# Patient Record
Sex: FEMALE | Race: WHITE | Employment: UNEMPLOYED | URBAN - METROPOLITAN AREA
[De-identification: names, ages, dates, MRNs, and addresses within clinical notes are randomized per-mention and may not be internally consistent; named-entity substitution may affect disease eponyms.]

---

## 2020-05-05 ENCOUNTER — HOSPITAL ENCOUNTER (OUTPATIENT)
Age: 25
Discharge: HOME | End: 2020-05-05
Payer: COMMERCIAL

## 2020-05-05 ENCOUNTER — HOSPITAL ENCOUNTER (OUTPATIENT)
Dept: CT IMAGING | Age: 25
Discharge: HOME | End: 2020-05-05
Payer: COMMERCIAL

## 2020-05-05 LAB
REASON FOR REJECTION: NORMAL
REASON FOR REJECTION: NORMAL
REJECTED TEST: NORMAL
REJECTED TEST: NORMAL

## 2020-05-05 PROCEDURE — 71275 CT ANGIOGRAPHY CHEST: CPT

## 2020-05-05 PROCEDURE — 36415 COLL VENOUS BLD VENIPUNCTURE: CPT

## 2020-05-05 PROCEDURE — 82550 ASSAY OF CK (CPK): CPT

## 2020-05-05 PROCEDURE — 6360000004 HC RX CONTRAST MEDICATION: Performed by: INTERNAL MEDICINE

## 2020-05-05 PROCEDURE — 85025 COMPLETE CBC W/AUTO DIFF WBC: CPT

## 2020-05-05 RX ORDER — SODIUM CHLORIDE 0.9 % (FLUSH) 0.9 %
10 SYRINGE (ML) INJECTION PRN
Status: DISCONTINUED | OUTPATIENT
Start: 2020-05-05 | End: 2020-05-06 | Stop reason: HOSPADM

## 2020-05-05 RX ADMIN — IOPAMIDOL 90 ML: 755 INJECTION, SOLUTION INTRAVENOUS at 13:30

## 2020-05-06 LAB
ALBUMIN SERPL-MCNC: 3.9 GM/DL (ref 3.4–5)
ALP BLD-CCNC: 63 IU/L (ref 40–128)
ALT SERPL-CCNC: 18 U/L (ref 10–40)
ANION GAP SERPL CALCULATED.3IONS-SCNC: 11 MMOL/L (ref 4–16)
AST SERPL-CCNC: 21 IU/L (ref 15–37)
BASOPHILS ABSOLUTE: 0.1 K/CU MM
BASOPHILS RELATIVE PERCENT: 1 % (ref 0–1)
BILIRUB SERPL-MCNC: 0.3 MG/DL (ref 0–1)
BUN BLDV-MCNC: 6 MG/DL (ref 6–23)
CALCIUM SERPL-MCNC: 9.1 MG/DL (ref 8.3–10.6)
CHLORIDE BLD-SCNC: 102 MMOL/L (ref 99–110)
CO2: 23 MMOL/L (ref 21–32)
CREAT SERPL-MCNC: 0.6 MG/DL (ref 0.6–1.1)
D DIMER: 656 NG/ML(DDU)
DIFFERENTIAL TYPE: ABNORMAL
EOSINOPHILS ABSOLUTE: 0.3 K/CU MM
EOSINOPHILS RELATIVE PERCENT: 5.2 % (ref 0–3)
GFR AFRICAN AMERICAN: >60 ML/MIN/1.73M2
GFR NON-AFRICAN AMERICAN: >60 ML/MIN/1.73M2
GLUCOSE BLD-MCNC: 91 MG/DL (ref 70–99)
HCT VFR BLD CALC: 42.6 % (ref 37–47)
HEMOGLOBIN: 13.4 GM/DL (ref 12.5–16)
IMMATURE NEUTROPHIL %: 0.2 % (ref 0–0.43)
LYMPHOCYTES ABSOLUTE: 2.4 K/CU MM
LYMPHOCYTES RELATIVE PERCENT: 39.4 % (ref 24–44)
MCH RBC QN AUTO: 29 PG (ref 27–31)
MCHC RBC AUTO-ENTMCNC: 31.5 % (ref 32–36)
MCV RBC AUTO: 92.2 FL (ref 78–100)
MONOCYTES ABSOLUTE: 0.5 K/CU MM
MONOCYTES RELATIVE PERCENT: 9 % (ref 0–4)
NUCLEATED RBC %: 0 %
PDW BLD-RTO: 12.7 % (ref 11.7–14.9)
PLATELET # BLD: 252 K/CU MM (ref 140–440)
PMV BLD AUTO: 11.9 FL (ref 7.5–11.1)
POTASSIUM SERPL-SCNC: 4.1 MMOL/L (ref 3.5–5.1)
RBC # BLD: 4.62 M/CU MM (ref 4.2–5.4)
SEGMENTED NEUTROPHILS ABSOLUTE COUNT: 2.7 K/CU MM
SEGMENTED NEUTROPHILS RELATIVE PERCENT: 45.2 % (ref 36–66)
SODIUM BLD-SCNC: 136 MMOL/L (ref 135–145)
TOTAL CK: 70 IU/L (ref 26–140)
TOTAL IMMATURE NEUTOROPHIL: 0.01 K/CU MM
TOTAL NUCLEATED RBC: 0 K/CU MM
TOTAL PROTEIN: 6.9 GM/DL (ref 6.4–8.2)
TROPONIN T: <0.01 NG/ML
WBC # BLD: 6 K/CU MM (ref 4–10.5)

## 2020-05-06 PROCEDURE — 84484 ASSAY OF TROPONIN QUANT: CPT

## 2020-05-06 PROCEDURE — 85025 COMPLETE CBC W/AUTO DIFF WBC: CPT

## 2020-05-06 PROCEDURE — 82550 ASSAY OF CK (CPK): CPT

## 2020-05-06 PROCEDURE — 80053 COMPREHEN METABOLIC PANEL: CPT

## 2020-05-06 PROCEDURE — 85379 FIBRIN DEGRADATION QUANT: CPT

## 2020-05-06 PROCEDURE — 36415 COLL VENOUS BLD VENIPUNCTURE: CPT

## 2022-06-10 ENCOUNTER — HOSPITAL ENCOUNTER (OUTPATIENT)
Dept: ULTRASOUND IMAGING | Age: 27
Discharge: HOME | End: 2022-06-10
Payer: COMMERCIAL

## 2022-06-10 DIAGNOSIS — R10.11 RIGHT UPPER QUADRANT PAIN: ICD-10-CM

## 2022-06-10 PROCEDURE — 76705 ECHO EXAM OF ABDOMEN: CPT

## 2022-08-01 ENCOUNTER — HOSPITAL ENCOUNTER (OUTPATIENT)
Dept: CT IMAGING | Age: 27
Discharge: HOME | End: 2022-08-01
Payer: COMMERCIAL

## 2022-08-01 DIAGNOSIS — R10.31 ABDOMINAL CRAMPING, BILATERAL LOWER QUADRANT: ICD-10-CM

## 2022-08-01 DIAGNOSIS — R10.32 ABDOMINAL CRAMPING, BILATERAL LOWER QUADRANT: ICD-10-CM

## 2022-08-01 DIAGNOSIS — R10.12 LUQ ABDOMINAL PAIN: ICD-10-CM

## 2022-08-01 PROCEDURE — 74177 CT ABD & PELVIS W/CONTRAST: CPT

## 2022-08-01 PROCEDURE — 6360000004 HC RX CONTRAST MEDICATION: Performed by: FAMILY MEDICINE

## 2022-08-01 RX ADMIN — IOHEXOL 50 ML: 240 INJECTION, SOLUTION INTRATHECAL; INTRAVASCULAR; INTRAVENOUS; ORAL at 10:36

## 2022-08-01 RX ADMIN — IOPAMIDOL 100 ML: 755 INJECTION, SOLUTION INTRAVENOUS at 12:16

## 2022-12-11 ENCOUNTER — HOSPITAL ENCOUNTER (EMERGENCY)
Age: 27
Discharge: HOME | End: 2022-12-11
Attending: EMERGENCY MEDICINE
Payer: COMMERCIAL

## 2022-12-11 ENCOUNTER — APPOINTMENT (OUTPATIENT)
Dept: GENERAL RADIOLOGY | Age: 27
End: 2022-12-11
Payer: COMMERCIAL

## 2022-12-11 VITALS
HEIGHT: 64 IN | OXYGEN SATURATION: 98 % | DIASTOLIC BLOOD PRESSURE: 76 MMHG | WEIGHT: 273 LBS | RESPIRATION RATE: 16 BRPM | BODY MASS INDEX: 46.61 KG/M2 | HEART RATE: 83 BPM | SYSTOLIC BLOOD PRESSURE: 140 MMHG | TEMPERATURE: 97.9 F

## 2022-12-11 DIAGNOSIS — S92.501A CLOSED FRACTURE OF PHALANX OF RIGHT FOURTH TOE, INITIAL ENCOUNTER: Primary | ICD-10-CM

## 2022-12-11 PROCEDURE — 6370000000 HC RX 637 (ALT 250 FOR IP): Performed by: EMERGENCY MEDICINE

## 2022-12-11 PROCEDURE — 73660 X-RAY EXAM OF TOE(S): CPT

## 2022-12-11 PROCEDURE — 99283 EMERGENCY DEPT VISIT LOW MDM: CPT

## 2022-12-11 RX ORDER — BUSPIRONE HYDROCHLORIDE 7.5 MG/1
TABLET ORAL 3 TIMES DAILY
COMMUNITY

## 2022-12-11 RX ORDER — BACILLUS COAGULANS/INULIN 1B-250 MG
CAPSULE ORAL
COMMUNITY

## 2022-12-11 RX ORDER — MULTIVIT-MIN/IRON/FOLIC ACID/K 18-600-40
CAPSULE ORAL
COMMUNITY

## 2022-12-11 RX ORDER — NAPROXEN 500 MG/1
500 TABLET ORAL ONCE
Status: COMPLETED | OUTPATIENT
Start: 2022-12-11 | End: 2022-12-11

## 2022-12-11 RX ORDER — NAPROXEN 500 MG/1
500 TABLET ORAL 2 TIMES DAILY
Qty: 20 TABLET | Refills: 0 | Status: SHIPPED | OUTPATIENT
Start: 2022-12-11

## 2022-12-11 RX ADMIN — NAPROXEN 500 MG: 500 TABLET ORAL at 17:16

## 2022-12-11 ASSESSMENT — PAIN SCALES - GENERAL: PAINLEVEL_OUTOF10: 5

## 2022-12-11 ASSESSMENT — PAIN - FUNCTIONAL ASSESSMENT: PAIN_FUNCTIONAL_ASSESSMENT: 0-10

## 2022-12-11 NOTE — ED PROVIDER NOTES
Emergency Department Encounter  Location: [facility] [address] [place] [address]    Patient: Chery Centeno  MRN: [id number]  : 1995  Date of evaluation: 2022  ED Provider: Stefan Leblanc DO, FACEP    Chief Complaint:    Toe Injury (Right 2, 3, 4th toes and possible shut in door or door hitting foot, bruising noted)    [place]:  Chery Centeno is a 24 y.o. female that presents to the emergency department with complaints of injury to her right fourth toe. The patient states she tripped going out on her porch to get an [place] and struck her foot against the edge of the door. She has discoloration and bruising to her right fourth toe. She is having difficulty ambulating secondary to pain. ROS:  At least 4 systems reviewed and otherwise acutely negative except as in the [address]   Negative for fever or chills  Negative for chest pain  Negative for shortness of breath  Negative for nausea vomiting diarrhea or constipation    Past Medical History:   Diagnosis Date    Anxiety     \"on Inderal for this\"    Environmental allergies      Past Surgical History:   Procedure Laterality Date    CHOLECYSTECTOMY, LAPAROSCOPIC  2016     Family History   Problem Relation Age of Onset    Depression Mother     Anxiety Disorder Mother      Social History     Socioeconomic History    Marital status: Single     Spouse name: Not on file    Number of children: Not on file    Years of education: Not on file    Highest education level: Not on file   Occupational History    Not on file   Tobacco Use    Smoking status: Never    Smokeless tobacco: Never   Substance and Sexual Activity    Alcohol use: Yes     Comment: average\"one time per week\"    Drug use: No    Sexual activity: Not on file   Other Topics Concern    Not on file   Social History Narrative    Not on file     Social Determinants of Health     Financial Resource Strain: Not on file   Food Insecurity: Not on file   Transportation Needs: Not on file Physical Activity: Not on file   Stress: Not on file   Social Connections: Not on file   Intimate Partner Violence: Not on file   Housing Stability: Not on file     Current Facility-Administered Medications   Medication Dose Route Frequency Provider Last Rate Last Admin    naproxen (NAPROSYN) tablet 500 mg  500 mg Oral Once Magdalene Level, DO         Current Outpatient Medications   Medication Sig Dispense Refill    Fexofenadine HCl (ALLEGRA PO) Take by mouth      naproxen (NAPROSYN) 500 MG tablet Take 1 tablet by mouth 2 times daily 20 tablet 0    busPIRone (BUSPAR) 7.5 MG tablet Take by mouth in the morning, at noon, and at bedtime      sertraline (ZOLOFT) 50 MG tablet Take by mouth daily      Bacillus Coagulans-Inulin (PROBIOTIC) 1-250 BILLION-MG CAPS as directed      Cholecalciferol (VITAMIN D) 50 MCG (2000 UT) CAPS capsule 1 tablet      docusate sodium (COLACE) 100 MG capsule Take 1 capsule by mouth daily as needed for Constipation (while taking pain medicine) (Patient not taking: Reported on 12/11/2022) 30 capsule 3    propranolol (INDERAL) 10 MG tablet Take 10 mg by mouth 2 times daily      Norgestim-Eth Estrad Triphasic (TRINESSA, 28, PO) Take by mouth nightly        No Known Allergies  Nursing Notes Reviewed    Physical Exam:  ED Triage Vitals [12/11/22 1510]   Enc Vitals Group      BP (!) 140/76      Heart Rate 83      Resp 16      Temp 97.9 °F (36.6 °C)      Temp Source Oral      SpO2 98 %      Weight 273 lb (123.8 kg)      Height 5' 4\" (1.626 m)      Head Circumference       Peak Flow       Pain Score       Pain Loc       Pain Edu? Excl. in [address]? GENERAL APPEARANCE: Awake and alert. Cooperative. No acute distress. Nontoxic in appearance  HEAD: Normocephalic. Atraumatic. EYES: Sclera anicteric. ENT: Tolerates saliva. NECK: Supple. Trachea midline. LUNGS: Respirations unlabored. EXTREMITIES: No acute deformities.   There is bruising to her fourth toe and tenderness to palpation of the distal phalanx. Sensation is intact and capillary refills less than 3 seconds in the nailbeds. SKIN: Warm and dry. NEUROLOGICAL: No gross facial drooping. PSYCHIATRIC: Normal mood. Labs:  No results found for this visit on 12/11/22. Radiographs (if obtained):  [] The following radiograph was interpreted by myself in the absence of a radiologist:  [x] Radiologist's Report reviewed at time of ED visit:  XR TOE RIGHT (MIN 2 VIEWS)   Final Result   No acute bony or joint abnormality             ED Course and MDM:  Patient presents to the emergency department with an injury to her toe. There is no obvious fracture seen by the radiologist however review of the x-ray shows that there may be a nondisplaced fracture on the lateral portion of the distal phalanx of the fourth toe. She will be najma taped for the fourth to the third. She is placed in a postop shoe and be started on crutches. She is referred to the orthopedic surgeon for recheck. She will be discharged stable condition as instructed return for problems or concerns. She started on Naprosyn for her pain. Final Impression:  1.  Closed fracture of phalanx of left fourth toe, initial encounter      DISPOSITION Discharge - Pending Orders Complete    Patient referred to:  Elian Jaimes DO  [address] [facility] [address] [place] [address] [place] [address]  [phone]    Schedule an appointment as soon as possible for a visit in 1 week  For follow up    Discharge medications:  New Prescriptions    NAPROXEN (NAPROSYN) 500 MG TABLET    Take 1 tablet by mouth 2 times daily     (Please note that portions of this note may have been completed with a voice recognition program. Efforts were made to edit the dictations but occasionally words are mis-transcribed.)    Jose Martinez DO, [address] [facility], [place], [facility],[address]  Board certified in [place] [employer] [place], [place]  12/11/22 2817

## 2022-12-11 NOTE — Clinical Note
Celso Starkey was seen and treated in our emergency department on 12/11/2022. She may return to work on 12/12/2022. Use crutches and postop shoe at work until cleared by orthopedic doctor to return to full duty     If you have any questions or concerns, please don't hesitate to call.       Victor Manuel Lee, DO

## 2022-12-17 SDOH — HEALTH STABILITY: PHYSICAL HEALTH: ON AVERAGE, HOW MANY MINUTES DO YOU ENGAGE IN EXERCISE AT THIS LEVEL?: 30 MIN

## 2022-12-17 SDOH — HEALTH STABILITY: PHYSICAL HEALTH: ON AVERAGE, HOW MANY DAYS PER WEEK DO YOU ENGAGE IN MODERATE TO STRENUOUS EXERCISE (LIKE A BRISK WALK)?: 3 DAYS

## 2022-12-17 ASSESSMENT — SOCIAL DETERMINANTS OF HEALTH (SDOH)

## 2022-12-20 ENCOUNTER — OFFICE VISIT (OUTPATIENT)
Dept: ORTHOPEDIC SURGERY | Age: 27
End: 2022-12-20
Payer: COMMERCIAL

## 2022-12-20 VITALS — HEIGHT: 64 IN | RESPIRATION RATE: 16 BRPM | WEIGHT: 270.3 LBS | BODY MASS INDEX: 46.15 KG/M2

## 2022-12-20 DIAGNOSIS — S90.31XA CONTUSION OF RIGHT FOOT, INITIAL ENCOUNTER: Primary | ICD-10-CM

## 2022-12-20 PROCEDURE — 1036F TOBACCO NON-USER: CPT

## 2022-12-20 PROCEDURE — G8427 DOCREV CUR MEDS BY ELIG CLIN: HCPCS

## 2022-12-20 PROCEDURE — G8484 FLU IMMUNIZE NO ADMIN: HCPCS

## 2022-12-20 PROCEDURE — 99203 OFFICE O/P NEW LOW 30 MIN: CPT

## 2022-12-20 PROCEDURE — G8417 CALC BMI ABV UP PARAM F/U: HCPCS

## 2022-12-20 RX ORDER — AMOXICILLIN AND CLAVULANATE POTASSIUM 875; 125 MG/1; MG/1
1 TABLET, FILM COATED ORAL EVERY 12 HOURS
COMMUNITY
Start: 2022-12-20 | End: 2022-12-30

## 2022-12-20 NOTE — PATIENT INSTRUCTIONS
Wear post operative shoe  May take Ibuprofen or Motrin as needed  Rest, ice, and elevate as needed  Work on ROM and strengthening exercises as discussed  Follow up in 4 weeks

## 2022-12-20 NOTE — PROGRESS NOTES
Patient is a 24y.o. year old female. Patient is in the office today with a fx to the right 4th toe. Patient states that she injured themselves by closing a door on her foot. Patient states that the injury happened 12.11.22. pt presented to the office in a post operative shoe. Pain scale  0/10.     Dominant Hand: right

## 2022-12-21 ASSESSMENT — ENCOUNTER SYMPTOMS
BACK PAIN: 0
SHORTNESS OF BREATH: 0
RHINORRHEA: 0
NAUSEA: 0
COUGH: 0
FACIAL SWELLING: 0

## 2022-12-21 NOTE — PROGRESS NOTES
12/20/2022   Chief Complaint   Patient presents with    Foot Injury     Right 4th toe        History of Present Illness:                             Zachery Deal is a 24 y.o. female presenting as a new patient to the office today with what she has been informed by the emergency room was a fracture to her right fourth toe. Patient states she had a door slam awkwardly on her foot by accident on 12/11/2022. She states she went to the emergency room thinking she had broke her foot/toes and she was placed in a postoperative shoe by the ER providers. Patient states her foot does not hurt while she is using the postoperative shoe but that when out of the shoe her pain levels do increase. She notes some slight bruising at the base of her third fourth and fifth toes as well as a small bruised area under her fourth toenail. Patient denies any distal paresthesias or temperature changes into the forefoot or toes. Patient is a 24y.o. year old female. Patient is in the office today with a fx to the right 4th toe. Patient states that she injured themselves by closing a door on her foot. Patient states that the injury happened 12.11.22. pt presented to the office in a post operative shoe. Pain scale  0/10. Medical History  Patient's medications, allergies, past medical, surgical, social and family histories were reviewed and updated as appropriate.     Past Medical History:   Diagnosis Date    Anxiety     \"on Inderal for this\"    Environmental allergies      Past Surgical History:   Procedure Laterality Date    CHOLECYSTECTOMY, LAPAROSCOPIC  08/09/2016     Family History   Problem Relation Age of Onset    Depression Mother     Anxiety Disorder Mother      Social History     Socioeconomic History    Marital status: Single     Spouse name: None    Number of children: None    Years of education: None    Highest education level: None   Tobacco Use    Smoking status: Never    Smokeless tobacco: Never   Substance and Sexual Activity    Alcohol use: Yes     Comment: average\"one time per week\"    Drug use: No     Social Determinants of Health     Physical Activity: Insufficiently Active    Days of Exercise per Week: 3 days    Minutes of Exercise per Session: 30 min   Intimate Partner Violence: Not At Risk    Fear of Current or Ex-Partner: No    Emotionally Abused: No    Physically Abused: No    Sexually Abused: No     Current Outpatient Medications   Medication Sig Dispense Refill    amoxicillin-clavulanate (AUGMENTIN) 875-125 MG per tablet Take 1 tablet by mouth in the morning and 1 tablet in the evening. Pseudoephedrine-DM-GG 60- MG TABS Take 1 tablet by mouth every 6 hours as needed      busPIRone (BUSPAR) 7.5 MG tablet Take by mouth in the morning, at noon, and at bedtime      sertraline (ZOLOFT) 50 MG tablet Take by mouth daily      Bacillus Coagulans-Inulin (PROBIOTIC) 1-250 BILLION-MG CAPS as directed      Cholecalciferol (VITAMIN D) 50 MCG (2000 UT) CAPS capsule 1 tablet      Fexofenadine HCl (ALLEGRA PO) Take by mouth      naproxen (NAPROSYN) 500 MG tablet Take 1 tablet by mouth 2 times daily 20 tablet 0    propranolol (INDERAL) 10 MG tablet Take 10 mg by mouth 2 times daily      Norgestim-Eth Estrad Triphasic (TRINESSA, 28, PO) Take by mouth nightly       docusate sodium (COLACE) 100 MG capsule Take 1 capsule by mouth daily as needed for Constipation (while taking pain medicine) (Patient not taking: Reported on 12/11/2022) 30 capsule 3     No current facility-administered medications for this visit. No Known Allergies      Review of Systems   Constitutional:  Negative for fever. HENT:  Negative for facial swelling and rhinorrhea. Respiratory:  Negative for cough and shortness of breath. Cardiovascular:  Negative for chest pain. Gastrointestinal:  Negative for nausea. Musculoskeletal:  Positive for arthralgias.  Negative for back pain, gait problem, joint swelling, myalgias, neck pain and neck stiffness. Skin:  Negative for pallor and rash. Neurological:  Negative for facial asymmetry and speech difficulty. Psychiatric/Behavioral:  Negative for agitation and confusion. Examination:  General Exam:  Vitals: Resp 16   Ht 5' 4\" (1.626 m)   Wt 270 lb 4.8 oz (122.6 kg)   LMP 12/12/2022   BMI 46.40 kg/m²    Physical Exam  Constitutional:       General: She is not in acute distress. Appearance: Normal appearance. She is obese. She is not ill-appearing. HENT:      Head: Normocephalic and atraumatic. Nose: No rhinorrhea. Eyes:      General: No scleral icterus. Right eye: No discharge. Left eye: No discharge. Cardiovascular:      Pulses: Normal pulses. Pulmonary:      Effort: Pulmonary effort is normal. No respiratory distress. Breath sounds: No stridor. Musculoskeletal:      Cervical back: Normal range of motion. Comments: Right foot exam: Patient right foot appears with mild ecchymosis at the base of her third fourth and fifth toes. There is also a very small subungual hematoma at the fourth toenail on the right foot. Patient maintains full active range of motion of the ankle, foot, and toes with minimal pain to the entirety of the fourth toe. Dorsal pedal pulse 2+, brisk capillary refill. Sensation light touch intact throughout all surfaces of the right lower extremity. Patient able to ambulate with the postoperative shoe without complication. Skin:     General: Skin is warm. Capillary Refill: Capillary refill takes less than 2 seconds. Coloration: Skin is not jaundiced or pale. Findings: Bruising present. Neurological:      General: No focal deficit present. Mental Status: She is alert and oriented to person, place, and time.    Psychiatric:         Mood and Affect: Mood normal.         Behavior: Behavior normal.      Diagnostic testing:  X-ray images were reviewed by myself and discussed with the patient:    Imaging results of the right foot from the emergency room:  Impression   No acute bony or joint abnormality           Office Procedures:  No orders of the defined types were placed in this encounter. Assessment and Plan  1. Contusion of the right forefoot    -I explained to the patient that I agree with the radiology report from her initial emergency room visit and I do not believe she has any acute fractures at this time. I do note the bruising on the dorsal aspect of her toes and reassured her that I am fairly confident she simply suffered a bruise/contusion to her toes and forefoot. I advised her to continue using the postoperative shoe for the next few days and then transition to an athletic shoe as tolerated. -Patient instructed to return to our office if she notices any changes in the small hematoma at the toenail. I do not suspect this will cause her any complications but I wanted her to be aware that she should at least keep an eye on the toenail for any signs of infection.  -Patient instructed to follow-up as needed with any future orthopedic concerns.     Electronically signed by Bobo Almodovar PA-C on 12/21/2022 at 7:45 AM

## 2023-01-18 ENCOUNTER — OFFICE VISIT (OUTPATIENT)
Dept: ORTHOPEDIC SURGERY | Age: 28
End: 2023-01-18

## 2023-01-18 DIAGNOSIS — S90.31XD CONTUSION OF RIGHT FOOT, SUBSEQUENT ENCOUNTER: Primary | ICD-10-CM

## 2023-01-18 ASSESSMENT — ENCOUNTER SYMPTOMS
COUGH: 0
BACK PAIN: 0
RHINORRHEA: 0
SHORTNESS OF BREATH: 0
FACIAL SWELLING: 0
NAUSEA: 0

## 2023-01-18 NOTE — PROGRESS NOTES
1/18/2023   No chief complaint on file. Updated HPI: Patient is here for reevaluation of her right foot fourth toe fracture that she was previously seen for approximately 5 weeks ago. Patient states that overall she states she is doing much better and is essentially asymptomatic. No new complaints or injuries. Patient actually states that she meant to cancel the appointment but forgot to and therefore is here but states that she is doing well and has no concerns. Previous HPI (12/20/2022): Lukas Stephens is a 24 y.o. female presenting as a new patient to the office today with what she has been informed by the emergency room was a fracture to her right fourth toe. Patient states she had a door slam awkwardly on her foot by accident on 12/11/2022. She states she went to the emergency room thinking she had broke her foot/toes and she was placed in a postoperative shoe by the ER providers. Patient states her foot does not hurt while she is using the postoperative shoe but that when out of the shoe her pain levels do increase. She notes some slight bruising at the base of her third fourth and fifth toes as well as a small bruised area under her fourth toenail. Patient denies any distal paresthesias or temperature changes into the forefoot or toes. Patient is a 24y.o. year old female. Patient is in the office today with a fx to the right 4th toe. Patient states that she injured themselves by closing a door on her foot. Patient states that the injury happened 12.11.22. pt presented to the office in a post operative shoe. Pain scale  0/10. Medical History  Patient's medications, allergies, past medical, surgical, social and family histories were reviewed and updated as appropriate.     Past Medical History:   Diagnosis Date    Anxiety     \"on Inderal for this\"    Environmental allergies      Past Surgical History:   Procedure Laterality Date    CHOLECYSTECTOMY, LAPAROSCOPIC  08/09/2016     Family History   Problem Relation Age of Onset    Depression Mother     Anxiety Disorder Mother      Social History     Socioeconomic History    Marital status: Single   Tobacco Use    Smoking status: Never    Smokeless tobacco: Never   Substance and Sexual Activity    Alcohol use: Yes     Comment: average\"one time per week\"    Drug use: No     Social Determinants of Health     Physical Activity: Insufficiently Active    Days of Exercise per Week: 3 days    Minutes of Exercise per Session: 30 min   Intimate Partner Violence: Not At Risk    Fear of Current or Ex-Partner: No    Emotionally Abused: No    Physically Abused: No    Sexually Abused: No     Current Outpatient Medications   Medication Sig Dispense Refill    Pseudoephedrine-DM-GG 60- MG TABS Take 1 tablet by mouth every 6 hours as needed      busPIRone (BUSPAR) 7.5 MG tablet Take by mouth in the morning, at noon, and at bedtime      sertraline (ZOLOFT) 50 MG tablet Take by mouth daily      Bacillus Coagulans-Inulin (PROBIOTIC) 1-250 BILLION-MG CAPS as directed      Cholecalciferol (VITAMIN D) 50 MCG (2000 UT) CAPS capsule 1 tablet      Fexofenadine HCl (ALLEGRA PO) Take by mouth      naproxen (NAPROSYN) 500 MG tablet Take 1 tablet by mouth 2 times daily 20 tablet 0    docusate sodium (COLACE) 100 MG capsule Take 1 capsule by mouth daily as needed for Constipation (while taking pain medicine) (Patient not taking: Reported on 12/11/2022) 30 capsule 3    propranolol (INDERAL) 10 MG tablet Take 10 mg by mouth 2 times daily      Norgestim-Eth Estrad Triphasic (TRINESSA, 28, PO) Take by mouth nightly        No current facility-administered medications for this visit. No Known Allergies      Review of Systems   Constitutional:  Negative for fever. HENT:  Negative for facial swelling and rhinorrhea. Respiratory:  Negative for cough and shortness of breath. Cardiovascular:  Negative for chest pain.    Gastrointestinal: Negative for nausea. Musculoskeletal:  Negative for arthralgias, back pain, gait problem, joint swelling, myalgias, neck pain and neck stiffness. Skin:  Negative for pallor and rash. Neurological:  Negative for facial asymmetry and speech difficulty. Psychiatric/Behavioral:  Negative for agitation and confusion. Examination:  General Exam:  Vitals: There were no vitals taken for this visit. Physical Exam  Constitutional:       General: She is not in acute distress. Appearance: Normal appearance. She is obese. She is not ill-appearing. HENT:      Head: Normocephalic and atraumatic. Nose: No rhinorrhea. Eyes:      General: No scleral icterus. Right eye: No discharge. Left eye: No discharge. Cardiovascular:      Pulses: Normal pulses. Pulmonary:      Effort: Pulmonary effort is normal. No respiratory distress. Breath sounds: No stridor. Musculoskeletal:      Cervical back: Normal range of motion. Comments: Right foot exam: Patient right foot appears normal.  There is also a very small, improved subungual hematoma at the fourth toenail on the right foot. Patient maintains full active range of motion of the ankle, foot, and toes with minimal pain to the entirety of the fourth toe. Dorsal pedal pulse 2+, brisk capillary refill. Sensation light touch intact throughout all surfaces of the right lower extremity. Patient able to ambulate with the postoperative shoe without complication. Skin:     General: Skin is warm. Capillary Refill: Capillary refill takes less than 2 seconds. Coloration: Skin is not jaundiced or pale. Findings: No bruising. Neurological:      General: No focal deficit present. Mental Status: She is alert and oriented to person, place, and time.    Psychiatric:         Mood and Affect: Mood normal.         Behavior: Behavior normal.      Diagnostic testing:  X-ray images were reviewed by myself and discussed with the patient:  3 views of the right foot in a skeletally mature patient demonstrates no acute osseous normalities.  Bone is well aligned.  No sign of any fracture.  Alignment is normal throughout.  Good bone quality.    Previous Imaging:  Imaging results of the right foot from the emergency room:  Impression   No acute bony or joint abnormality           Office Procedures:  No orders of the defined types were placed in this encounter.      Assessment and Plan  1.  Contusion of the right forefoot   Patient's issues are completely resolved.  Reassurance provided that she can return to all activities as needed but I would continue hard soled shoes for approximately 1 more month but can return activities as tolerated afterwards.  She continue ice and over-the-counter pain medication for control.  She has any issues please feel free to return otherwise she can be seen as needed.      Electronically signed by Brennen Flores DO on 1/18/2023 at 4:28 PM

## 2023-01-20 PROBLEM — S90.31XA CONTUSION OF RIGHT FOOT: Status: ACTIVE | Noted: 2023-01-20

## 2023-12-29 ENCOUNTER — OFFICE VISIT (OUTPATIENT)
Dept: BARIATRICS/WEIGHT MGMT | Age: 28
End: 2023-12-29

## 2023-12-29 VITALS — BODY MASS INDEX: 50.02 KG/M2 | HEIGHT: 64 IN | WEIGHT: 293 LBS

## 2023-12-29 DIAGNOSIS — E66.01 MORBID OBESITY WITH BMI OF 50.0-59.9, ADULT (HCC): Primary | ICD-10-CM

## 2023-12-29 PROCEDURE — NBSRV NON-BILLABLE SERVICE: Performed by: SURGERY

## 2024-01-26 ENCOUNTER — OFFICE VISIT (OUTPATIENT)
Dept: BARIATRICS/WEIGHT MGMT | Age: 29
End: 2024-01-26

## 2024-01-26 VITALS — WEIGHT: 293 LBS | HEIGHT: 64 IN | BODY MASS INDEX: 50.02 KG/M2

## 2024-01-26 DIAGNOSIS — E66.01 MORBID OBESITY WITH BMI OF 50.0-59.9, ADULT (HCC): Primary | ICD-10-CM

## 2024-01-26 NOTE — PROGRESS NOTES
[facility] Loss [facility]    REASON FOR VISIT:    Chief Complaint:    Chief Complaint   Patient presents with    Weight Management         OBJECTIVE:  Physical Exam   Ht 1.626 m (5' 4\")   Wt (!) 144.7 kg (319 lb)   BMI 54.76 kg/m²                Patient gained 2.8lbs

## 2024-03-06 ENCOUNTER — PROCEDURE VISIT (OUTPATIENT)
Dept: NEUROLOGY | Age: 29
End: 2024-03-06
Payer: COMMERCIAL

## 2024-03-06 DIAGNOSIS — G56.01 RIGHT CARPAL TUNNEL SYNDROME: Primary | ICD-10-CM

## 2024-03-06 PROCEDURE — 95886 MUSC TEST DONE W/N TEST COMP: CPT | Performed by: STUDENT IN AN ORGANIZED HEALTH CARE EDUCATION/TRAINING PROGRAM

## 2024-03-06 PROCEDURE — 95909 NRV CNDJ TST 5-6 STUDIES: CPT | Performed by: STUDENT IN AN ORGANIZED HEALTH CARE EDUCATION/TRAINING PROGRAM

## 2024-03-06 NOTE — PROGRESS NOTES
EMG Upper Limbs:   EMG/NCS RIGHT UPPER EXTREMITY:    Reason for referral/Clinical data:    Janeth presents today for right upper extremity EMG to assess numbness in digits 1 through 3 of the right hand.  She states symptoms have been present since at least October.She does report that symptoms do seem to be worse in the morning upon awakening.  She has recently been using a wrist splint at nighttime which seems to have helped her symptoms.      Refer to the Electrodiagnostic Data Sheet for normative values, specific techniques utilized for conductions, the numeric values obtained on nerve conductions, and specific muscles sampled for needle examination.   Informed consent was given by the patient after discussion of the following - Expected potential benefits of procedure include the following: identifying diagnoses, excluding diagnoses, and helping the treating practitioners to prescribe treatment, testing, and follow-up. Possible adverse events of electrodiagnostic testing include local discomfort, mild bleeding or bruising (common) and rare/uncommon events such as infection, nausea, fainting, or other idiosyncratic adverse events.    Motor studies:  -- Right median motor response demonstrates amplitude which is normal, distal latency which is normal, and conduction velocity which is normal.    -- Right ulnar motor response demonstrates amplitude which is normal, distal latency which is normal, and conduction velocity which is normal in both forearm and elbow segments.   Ryan-Taiwo anastamosis findings are not identified.      Sensory studies:  -- Right median sensory nerve conduction response demonstrates amplitude which is normal, and distal latency which is normal.     -- Right ulnar sensory nerve conduction response demonstrates normal amplitude, and distal latency which is normal.     -- Right radial sensory study is normal for amplitude and latency.      NEEDLE ELECTRODE EXAMINATION  Needle examination